# Patient Record
Sex: MALE | Race: WHITE | NOT HISPANIC OR LATINO | URBAN - METROPOLITAN AREA
[De-identification: names, ages, dates, MRNs, and addresses within clinical notes are randomized per-mention and may not be internally consistent; named-entity substitution may affect disease eponyms.]

---

## 2020-03-11 PROBLEM — 399068003 MALIGNANT TUMOR OF PROSTATE: Status: ACTIVE | Noted: 2020-03-11

## 2020-03-11 PROBLEM — 90560007 GOUT: Status: ACTIVE | Noted: 2020-03-11

## 2020-03-11 PROBLEM — 38341003 HYPERTENSION: Status: ACTIVE | Noted: 2020-03-11

## 2021-07-27 ENCOUNTER — OFFICE VISIT (OUTPATIENT)
Dept: URBAN - METROPOLITAN AREA CLINIC 13 | Facility: CLINIC | Age: 71
End: 2021-07-27

## 2021-07-27 PROBLEM — 134407002 CHRONIC BACK PAIN: Status: ACTIVE | Noted: 2021-07-27

## 2021-07-27 PROBLEM — 13644009 HYPERCHOLESTEROLEMIA: Status: ACTIVE | Noted: 2021-07-27

## 2021-07-27 PROBLEM — 95570007 KIDNEY STONE: Status: ACTIVE | Noted: 2021-07-27

## 2021-08-05 ENCOUNTER — OFFICE VISIT (OUTPATIENT)
Dept: URBAN - METROPOLITAN AREA CLINIC 46 | Facility: CLINIC | Age: 71
End: 2021-08-05

## 2021-08-28 ENCOUNTER — TELEPHONE ENCOUNTER (OUTPATIENT)
Dept: URBAN - METROPOLITAN AREA CLINIC 13 | Facility: CLINIC | Age: 71
End: 2021-08-28

## 2021-08-28 RX ORDER — TAMSULOSIN HYDROCHLORIDE 0.4 MG/1
CAPSULE ORAL
OUTPATIENT
End: 2021-07-27

## 2021-08-28 RX ORDER — MELOXICAM 15 MG/1
TABLET ORAL
OUTPATIENT
End: 2021-07-27

## 2021-08-28 RX ORDER — POTASSIUM CHLORIDE 750 MG/1
CAPSULE, EXTENDED RELEASE ORAL
OUTPATIENT
End: 2021-07-27

## 2021-08-29 ENCOUNTER — TELEPHONE ENCOUNTER (OUTPATIENT)
Dept: URBAN - METROPOLITAN AREA CLINIC 13 | Facility: CLINIC | Age: 71
End: 2021-08-29

## 2021-08-29 RX ORDER — FINASTERIDE 5 MG/1
TABLET, FILM COATED ORAL
Status: ACTIVE | COMMUNITY

## 2021-08-29 RX ORDER — FLUTICASONE PROPIONATE 50 UG/1
SPRAY, METERED NASAL
Status: ACTIVE | COMMUNITY

## 2021-08-29 RX ORDER — LOSARTAN POTASSIUM 100 MG/1
TABLET, FILM COATED ORAL
Status: ACTIVE | COMMUNITY

## 2021-08-29 RX ORDER — MECLIZINE HYDROCHLORIDE 25 MG/1
TABLET ORAL
Status: ACTIVE | COMMUNITY

## 2021-08-29 RX ORDER — ALLOPURINOL 300 MG/1
TABLET ORAL
Status: ACTIVE | COMMUNITY

## 2021-08-29 RX ORDER — FUROSEMIDE 20 MG/1
TABLET ORAL
Status: ACTIVE | COMMUNITY

## 2021-08-29 RX ORDER — ASPIRIN 81 MG/1
TABLET, COATED ORAL
Status: ACTIVE | COMMUNITY

## 2021-08-29 RX ORDER — AMLODIPINE BESYLATE 10 MG/1
TABLET ORAL
Status: ACTIVE | COMMUNITY

## 2021-08-29 RX ORDER — TRAMADOL HYDROCHLORIDE 50 MG/1
TABLET, FILM COATED ORAL
Status: ACTIVE | COMMUNITY

## 2021-10-28 ENCOUNTER — OFFICE VISIT (OUTPATIENT)
Dept: URBAN - METROPOLITAN AREA CLINIC 46 | Facility: CLINIC | Age: 71
End: 2021-10-28
Payer: MEDICARE

## 2021-10-28 VITALS
HEIGHT: 67 IN | HEART RATE: 90 BPM | DIASTOLIC BLOOD PRESSURE: 53 MMHG | SYSTOLIC BLOOD PRESSURE: 158 MMHG | TEMPERATURE: 99.1 F | BODY MASS INDEX: 27.37 KG/M2 | WEIGHT: 174.4 LBS

## 2021-10-28 DIAGNOSIS — K70.30 ALCOHOLIC CIRRHOSIS OF LIVER WITHOUT ASCITES: ICD-10-CM

## 2021-10-28 DIAGNOSIS — D50.0 IRON DEFICIENCY ANEMIA DUE TO CHRONIC BLOOD LOSS: ICD-10-CM

## 2021-10-28 PROCEDURE — 99204 OFFICE O/P NEW MOD 45 MIN: CPT | Performed by: INTERNAL MEDICINE

## 2021-10-28 RX ORDER — FUROSEMIDE 20 MG/1
TABLET ORAL
Status: DISCONTINUED | COMMUNITY

## 2021-10-28 RX ORDER — FINASTERIDE 5 MG/1
TABLET, FILM COATED ORAL
Status: DISCONTINUED | COMMUNITY

## 2021-10-28 RX ORDER — ALLOPURINOL 300 MG/1
1 TABLET TABLET ORAL ONCE A DAY
Status: ACTIVE | COMMUNITY

## 2021-10-28 RX ORDER — FUROSEMIDE 20 MG/1
1 TABLET TABLET ORAL ONCE A DAY
Status: ACTIVE | COMMUNITY

## 2021-10-28 RX ORDER — ASPIRIN 81 MG/1
TABLET, COATED ORAL
Status: DISCONTINUED | COMMUNITY

## 2021-10-28 RX ORDER — FLUTICASONE PROPIONATE 50 UG/1
SPRAY, METERED NASAL
Status: DISCONTINUED | COMMUNITY

## 2021-10-28 RX ORDER — TRAMADOL HYDROCHLORIDE 50 MG/1
TABLET, FILM COATED ORAL
Status: DISCONTINUED | COMMUNITY

## 2021-10-28 RX ORDER — GABAPENTIN 300 MG/1
1 CAPSULE CAPSULE ORAL ONCE A DAY
Status: ACTIVE | COMMUNITY

## 2021-10-28 RX ORDER — TAMSULOSIN HYDROCHLORIDE 0.4 MG/1
1 CAPSULE CAPSULE ORAL ONCE A DAY
Status: DISCONTINUED | COMMUNITY

## 2021-10-28 RX ORDER — ALLOPURINOL 300 MG/1
TABLET ORAL
Status: DISCONTINUED | COMMUNITY

## 2021-10-28 RX ORDER — ALBUTEROL SULFATE 90 UG/1
1 PUFF AS NEEDED AEROSOL, METERED RESPIRATORY (INHALATION)
Status: ACTIVE | COMMUNITY

## 2021-10-28 RX ORDER — TURMERIC/TURMERIC ROOT EXTRACT 450MG-50MG
AS DIRECTED CAPSULE ORAL
Status: ACTIVE | COMMUNITY

## 2021-10-28 RX ORDER — ECHINACEA 400 MG
AS DIRECTED CAPSULE ORAL
Status: ACTIVE | COMMUNITY

## 2021-10-28 RX ORDER — AMLODIPINE BESYLATE 10 MG/1
TABLET ORAL
Status: DISCONTINUED | COMMUNITY

## 2021-10-28 RX ORDER — MECLIZINE HYDROCHLORIDE 25 MG/1
TABLET ORAL
Status: DISCONTINUED | COMMUNITY

## 2021-10-28 RX ORDER — LOSARTAN POTASSIUM 100 MG/1
TABLET, FILM COATED ORAL
Status: DISCONTINUED | COMMUNITY

## 2021-11-01 ENCOUNTER — CLAIMS CREATED FROM THE CLAIM WINDOW (OUTPATIENT)
Dept: URBAN - METROPOLITAN AREA CLINIC 4 | Facility: CLINIC | Age: 71
End: 2021-11-01
Payer: MEDICARE

## 2021-11-01 ENCOUNTER — OFFICE VISIT (OUTPATIENT)
Dept: URBAN - METROPOLITAN AREA SURGERY CENTER 28 | Facility: SURGERY CENTER | Age: 71
End: 2021-11-01
Payer: MEDICARE

## 2021-11-01 DIAGNOSIS — D12.3 BENIGN NEOPLASM OF TRANSVERSE COLON: ICD-10-CM

## 2021-11-01 DIAGNOSIS — K31.89 ACQUIRED DEFORMITY OF DUODENUM: ICD-10-CM

## 2021-11-01 DIAGNOSIS — D12.3 ADENOMA OF TRANSVERSE COLON: ICD-10-CM

## 2021-11-01 DIAGNOSIS — D12.5 ADENOMA OF SIGMOID COLON: ICD-10-CM

## 2021-11-01 DIAGNOSIS — K92.2 ACUTE GASTROINTESTINAL BLEEDING: ICD-10-CM

## 2021-11-01 DIAGNOSIS — D12.2 ADENOMA OF ASCENDING COLON: ICD-10-CM

## 2021-11-01 DIAGNOSIS — D12.5 BENIGN NEOPLASM OF SIGMOID COLON: ICD-10-CM

## 2021-11-01 DIAGNOSIS — D12.2 BENIGN NEOPLASM OF ASCENDING COLON: ICD-10-CM

## 2021-11-01 DIAGNOSIS — K92.1 ACUTE MELENA: ICD-10-CM

## 2021-11-01 DIAGNOSIS — K31.89 DEFORMED PYLORUS, ACQUIRED: ICD-10-CM

## 2021-11-01 PROCEDURE — G8907 PT DOC NO EVENTS ON DISCHARG: HCPCS | Performed by: INTERNAL MEDICINE

## 2021-11-01 PROCEDURE — 88305 TISSUE EXAM BY PATHOLOGIST: CPT | Performed by: PATHOLOGY

## 2021-11-01 PROCEDURE — 43239 EGD BIOPSY SINGLE/MULTIPLE: CPT | Performed by: INTERNAL MEDICINE

## 2021-11-01 PROCEDURE — 45385 COLONOSCOPY W/LESION REMOVAL: CPT | Performed by: INTERNAL MEDICINE

## 2021-11-01 PROCEDURE — 88312 SPECIAL STAINS GROUP 1: CPT | Performed by: PATHOLOGY

## 2021-11-01 RX ORDER — ALLOPURINOL 300 MG/1
1 TABLET TABLET ORAL ONCE A DAY
Status: ACTIVE | COMMUNITY

## 2021-11-01 RX ORDER — OMEPRAZOLE 40 MG/1
1 CAPSULE 30 MINUTES BEFORE MORNING MEAL CAPSULE, DELAYED RELEASE ORAL ONCE A DAY
Qty: 60 | Refills: 0 | OUTPATIENT
Start: 2021-11-01

## 2021-11-01 RX ORDER — FUROSEMIDE 20 MG/1
1 TABLET TABLET ORAL ONCE A DAY
Status: ACTIVE | COMMUNITY

## 2021-11-01 RX ORDER — GABAPENTIN 300 MG/1
1 CAPSULE CAPSULE ORAL ONCE A DAY
Status: ACTIVE | COMMUNITY

## 2021-11-01 RX ORDER — ALBUTEROL SULFATE 90 UG/1
1 PUFF AS NEEDED AEROSOL, METERED RESPIRATORY (INHALATION)
Status: ACTIVE | COMMUNITY

## 2021-11-01 RX ORDER — ECHINACEA 400 MG
AS DIRECTED CAPSULE ORAL
Status: ACTIVE | COMMUNITY

## 2021-11-01 RX ORDER — TURMERIC/TURMERIC ROOT EXTRACT 450MG-50MG
AS DIRECTED CAPSULE ORAL
Status: ACTIVE | COMMUNITY

## 2022-01-01 ENCOUNTER — OFFICE VISIT (OUTPATIENT)
Dept: URBAN - METROPOLITAN AREA CLINIC 46 | Facility: CLINIC | Age: 72
End: 2022-01-01

## 2022-01-01 ENCOUNTER — OFFICE VISIT (OUTPATIENT)
Dept: URBAN - METROPOLITAN AREA CLINIC 46 | Facility: CLINIC | Age: 72
End: 2022-01-01
Payer: MEDICARE

## 2022-01-01 ENCOUNTER — TELEPHONE ENCOUNTER (OUTPATIENT)
Dept: URBAN - METROPOLITAN AREA CLINIC 46 | Facility: CLINIC | Age: 72
End: 2022-01-01

## 2022-01-01 VITALS
HEIGHT: 67 IN | WEIGHT: 197.6 LBS | SYSTOLIC BLOOD PRESSURE: 124 MMHG | BODY MASS INDEX: 31.01 KG/M2 | HEART RATE: 93 BPM | TEMPERATURE: 98.3 F | DIASTOLIC BLOOD PRESSURE: 67 MMHG

## 2022-01-01 DIAGNOSIS — K70.30 CIRRHOSIS, ALCOHOLIC: ICD-10-CM

## 2022-01-01 DIAGNOSIS — K64.9 HEMORRHOIDS WITHOUT COMPLICATION: ICD-10-CM

## 2022-01-01 DIAGNOSIS — K64.2 GRADE III HEMORRHOIDS: ICD-10-CM

## 2022-01-01 DIAGNOSIS — K21.9 CHRONIC GERD: ICD-10-CM

## 2022-01-01 DIAGNOSIS — D50.0 IRON DEFICIENCY ANEMIA DUE TO CHRONIC BLOOD LOSS: ICD-10-CM

## 2022-01-01 PROCEDURE — 99213 OFFICE O/P EST LOW 20 MIN: CPT | Performed by: INTERNAL MEDICINE

## 2022-01-01 PROCEDURE — 46221 LIGATION OF HEMORRHOID(S): CPT | Performed by: INTERNAL MEDICINE

## 2022-01-01 RX ORDER — OMEPRAZOLE 40 MG/1
TAKE 1 CAPSULE BY MOUTH 30 MINUTES BEFORE A MORNING MEAL ONCE EACH MORNING CAPSULE, DELAYED RELEASE ORAL
Status: ACTIVE | COMMUNITY

## 2022-01-01 RX ORDER — FUROSEMIDE 20 MG/1
1 TABLET TABLET ORAL ONCE A DAY
Status: ACTIVE | COMMUNITY

## 2022-01-01 RX ORDER — ALLOPURINOL 300 MG/1
1 TABLET TABLET ORAL ONCE A DAY
Status: ACTIVE | COMMUNITY

## 2022-01-01 RX ORDER — ALBUTEROL SULFATE 90 UG/1
1 PUFF AS NEEDED AEROSOL, METERED RESPIRATORY (INHALATION)
Status: ACTIVE | COMMUNITY

## 2022-01-01 RX ORDER — TURMERIC/TURMERIC ROOT EXTRACT 450MG-50MG
AS DIRECTED CAPSULE ORAL
Status: ACTIVE | COMMUNITY

## 2022-01-01 RX ORDER — OMEPRAZOLE 40 MG/1
TAKE 1 CAPSULE BY MOUTH 30 MINUTES BEFORE A MORNING MEAL ONCE EACH MORNING CAPSULE, DELAYED RELEASE ORAL
OUTPATIENT

## 2022-01-01 RX ORDER — GABAPENTIN 300 MG/1
1 CAPSULE CAPSULE ORAL ONCE A DAY
Status: ACTIVE | COMMUNITY

## 2022-01-01 RX ORDER — METHOCARBAMOL TABLETS 750 MG/1
TABLET, COATED ORAL
Qty: 60 TABLET | Status: ACTIVE | COMMUNITY

## 2022-01-01 RX ORDER — ECHINACEA 400 MG
AS DIRECTED CAPSULE ORAL
Status: ACTIVE | COMMUNITY

## 2022-01-01 RX ORDER — LOSARTAN POTASSIUM 100 MG/1
TABLET, FILM COATED ORAL
Qty: 90 TABLET | Status: ACTIVE | COMMUNITY

## 2022-01-18 ENCOUNTER — OFFICE VISIT (OUTPATIENT)
Dept: URBAN - METROPOLITAN AREA CLINIC 46 | Facility: CLINIC | Age: 72
End: 2022-01-18

## 2022-01-20 ENCOUNTER — ERX REFILL RESPONSE (OUTPATIENT)
Dept: URBAN - METROPOLITAN AREA CLINIC 44 | Facility: CLINIC | Age: 72
End: 2022-01-20

## 2022-01-20 RX ORDER — OMEPRAZOLE 40 MG/1
TAKE 1 CAPSULE BY MOUTH 30 MINUTES BEFORE A MORNING MEAL ONCE EACH MORNING CAPSULE, DELAYED RELEASE ORAL
Qty: 60 CAPSULE | Refills: 4 | OUTPATIENT

## 2022-01-20 RX ORDER — OMEPRAZOLE 40 MG/1
1 CAPSULE 30 MINUTES BEFORE MORNING MEAL CAPSULE, DELAYED RELEASE ORAL ONCE A DAY
Qty: 60 | Refills: 0 | OUTPATIENT

## 2022-02-15 ENCOUNTER — OFFICE VISIT (OUTPATIENT)
Dept: URBAN - METROPOLITAN AREA CLINIC 46 | Facility: CLINIC | Age: 72
End: 2022-02-15
Payer: MEDICARE

## 2022-02-15 VITALS
DIASTOLIC BLOOD PRESSURE: 75 MMHG | HEIGHT: 67 IN | SYSTOLIC BLOOD PRESSURE: 129 MMHG | WEIGHT: 187.2 LBS | TEMPERATURE: 98.4 F | HEART RATE: 105 BPM | BODY MASS INDEX: 29.38 KG/M2

## 2022-02-15 DIAGNOSIS — D50.0 IRON DEFICIENCY ANEMIA DUE TO CHRONIC BLOOD LOSS: ICD-10-CM

## 2022-02-15 DIAGNOSIS — K21.9 CHRONIC GERD: ICD-10-CM

## 2022-02-15 DIAGNOSIS — R79.89 LFT ELEVATION: ICD-10-CM

## 2022-02-15 DIAGNOSIS — K70.30 CIRRHOSIS, ALCOHOLIC: ICD-10-CM

## 2022-02-15 PROCEDURE — 99214 OFFICE O/P EST MOD 30 MIN: CPT | Performed by: INTERNAL MEDICINE

## 2022-02-15 RX ORDER — ECHINACEA 400 MG
AS DIRECTED CAPSULE ORAL
Status: ACTIVE | COMMUNITY

## 2022-02-15 RX ORDER — TURMERIC/TURMERIC ROOT EXTRACT 450MG-50MG
AS DIRECTED CAPSULE ORAL
Status: ACTIVE | COMMUNITY

## 2022-02-15 RX ORDER — ALLOPURINOL 300 MG/1
1 TABLET TABLET ORAL ONCE A DAY
Status: ACTIVE | COMMUNITY

## 2022-02-15 RX ORDER — ALBUTEROL SULFATE 90 UG/1
1 PUFF AS NEEDED AEROSOL, METERED RESPIRATORY (INHALATION)
Status: ACTIVE | COMMUNITY

## 2022-02-15 RX ORDER — OMEPRAZOLE 40 MG/1
TAKE 1 CAPSULE BY MOUTH 30 MINUTES BEFORE A MORNING MEAL ONCE EACH MORNING CAPSULE, DELAYED RELEASE ORAL
OUTPATIENT

## 2022-02-15 RX ORDER — OMEPRAZOLE 40 MG/1
TAKE 1 CAPSULE BY MOUTH 30 MINUTES BEFORE A MORNING MEAL ONCE EACH MORNING CAPSULE, DELAYED RELEASE ORAL
Qty: 60 CAPSULE | Refills: 4 | Status: ACTIVE | COMMUNITY

## 2022-02-15 RX ORDER — FUROSEMIDE 20 MG/1
1 TABLET TABLET ORAL ONCE A DAY
Status: ACTIVE | COMMUNITY

## 2022-02-15 RX ORDER — GABAPENTIN 300 MG/1
1 CAPSULE CAPSULE ORAL ONCE A DAY
Status: ACTIVE | COMMUNITY

## 2022-02-15 NOTE — HPI-TODAY'S VISIT:
Pt presented 8/5/2021 with six months of post prandial nausea, anorexia, bowel urgency and loose stools, 30lb weight loss. Admitted to 30 years of heavy ETOH use. Labs with AFP 4; Albumin 3.3; ALk 268; ; ALT 52; Bili 4.2  Hgb 11; ; . Viral hep panel negative. Pt felt to likely have decompensated ETOH cirrhosis and possible underlying malignancy. CT scan of the abd/pelvis ordered and showed cirrhotic liver with no lesions and only trace ascites.   Seen for a follow up 10/2021 and had a drop in Hgb to  8.9 with subjective melena; was taking ASA daily and stopped. A diagnostic EGD and colonoscopy was then peformed with no portal HTN but there was an erosive gastropathy for which Omeprazole 40mg daily prescribed; no bleeding source in the colon but multiple colon adenomas for which a three year surveillance colonoscopy advised. Pt was already on oral iron. Now presents for a follow up and states was referred to Dr Messina with Hematology and had labs last week. Finished 4 months of oral iron and is off now. No overt bleeding fatigue stable. States took Omeprazole for 60 days and stopped and has been off ASA and no NSAIDS. States started to have some indigestion after stopping Omeprazole and we called in another Rx and taking prn with relief. Weight has been stable and gaining again. No ETOH in 6 montbs.

## 2022-08-04 PROBLEM — 721705006: Status: ACTIVE | Noted: 2022-01-01

## 2022-08-04 NOTE — HPI-TODAY'S VISIT:
Pt presented 8/5/2021 with six months of post prandial nausea, anorexia, bowel urgency and loose stools, 30lb weight loss. Admitted to 30 years of heavy ETOH use. Labs with AFP 4; Albumin 3.3; ALk 268; ; ALT 52; Bili 4.2  Hgb 11; ; . Viral hep panel negative. Pt felt to likely have decompensated ETOH cirrhosis and possible underlying malignancy. CT scan of the abd/pelvis ordered and showed cirrhotic liver with no lesions and only trace ascites.   Seen for a follow up 10/2021 and had a drop in Hgb to  8.9 with subjective melena; was taking ASA daily and stopped. A diagnostic EGD and colonoscopy was then peformed with no portal HTN but there was an erosive gastropathy for which Omeprazole 40mg daily prescribed; no bleeding source in the colon but multiple colon adenomas for which a three year surveillance colonoscopy advised. Pt was already on oral iron.   Last seen 2/2022: was referred to Dr Messina with Hematology and had labs last pending; just finished 4 months of oral iron and was off. Was off PPI. Denied ASA, NSAID, ETOH. Pt states did have to later get IV iron and per last labs in 5/2022 Hgb back to normal. Has a follow up this month. States no signs of hepatic decompensation; no overt GI bleeding; no abdominal swelling. Loose stools resolved and no nausea. Weight stable. Is stating has a hemorrhoid and has trouble cleansing after BM's.

## 2022-08-04 NOTE — PHYSICAL EXAM GASTROINTESTINAL
Abdomen , soft, nontender, nondistended , no guarding or rigidity , no masses palpable , normal bowel sounds , Liver and Spleen,  no hepatosplenomegaly , liver nontender. Diastsis recti Rectal: RP tag and fecal smearing

## 2022-08-10 NOTE — PHYSICAL EXAM HENT:
Head, normocephalic, atraumatic, Face, Face within normal limits, Ears, External ears within normal limits
Statement Selected

## 2023-01-01 ENCOUNTER — OFFICE VISIT (OUTPATIENT)
Dept: URBAN - METROPOLITAN AREA CLINIC 46 | Facility: CLINIC | Age: 73
End: 2023-01-01
Payer: MEDICARE

## 2023-01-01 ENCOUNTER — OFFICE VISIT (OUTPATIENT)
Dept: URBAN - METROPOLITAN AREA CLINIC 46 | Facility: CLINIC | Age: 73
End: 2023-01-01

## 2023-01-01 ENCOUNTER — ERX REFILL RESPONSE (OUTPATIENT)
Dept: URBAN - METROPOLITAN AREA CLINIC 44 | Facility: CLINIC | Age: 73
End: 2023-01-01

## 2023-01-01 ENCOUNTER — DASHBOARD ENCOUNTERS (OUTPATIENT)
Age: 73
End: 2023-01-01

## 2023-01-01 VITALS
BODY MASS INDEX: 32.77 KG/M2 | OXYGEN SATURATION: 97 % | TEMPERATURE: 98.4 F | WEIGHT: 208.8 LBS | HEART RATE: 89 BPM | HEIGHT: 67 IN | SYSTOLIC BLOOD PRESSURE: 125 MMHG | DIASTOLIC BLOOD PRESSURE: 64 MMHG

## 2023-01-01 DIAGNOSIS — K64.1 GRADE II HEMORRHOIDS: ICD-10-CM

## 2023-01-01 DIAGNOSIS — K70.30 CIRRHOSIS, ALCOHOLIC: ICD-10-CM

## 2023-01-01 DIAGNOSIS — K21.9 CHRONIC GERD: ICD-10-CM

## 2023-01-01 DIAGNOSIS — R79.89 LFT ELEVATION: ICD-10-CM

## 2023-01-01 PROCEDURE — 99214 OFFICE O/P EST MOD 30 MIN: CPT | Performed by: INTERNAL MEDICINE

## 2023-01-01 PROCEDURE — 46221 LIGATION OF HEMORRHOID(S): CPT | Performed by: INTERNAL MEDICINE

## 2023-01-01 RX ORDER — LOSARTAN POTASSIUM 100 MG/1
1 TABLET TABLET, FILM COATED ORAL ONCE A DAY
Qty: 90 TABLET | Status: ACTIVE | COMMUNITY

## 2023-01-01 RX ORDER — OMEPRAZOLE 40 MG/1
TAKE 1 CAPSULE BY MOUTH 30 MINUTES BEFORE A MORNING MEAL ONCE EACH MORNING CAPSULE, DELAYED RELEASE ORAL
Qty: 60 CAPSULE | Refills: 5 | OUTPATIENT

## 2023-01-01 RX ORDER — ECHINACEA 400 MG
AS DIRECTED CAPSULE ORAL ONCE A DAY
Status: ACTIVE | COMMUNITY

## 2023-01-01 RX ORDER — OMEPRAZOLE 40 MG/1
TAKE 1 CAPSULE BY MOUTH 30 MINUTES BEFORE A MORNING MEAL ONCE EACH MORNING CAPSULE, DELAYED RELEASE ORAL
Qty: 60 CAPSULE | Refills: 5 | Status: ACTIVE | COMMUNITY

## 2023-01-01 RX ORDER — OMEPRAZOLE 40 MG/1
TAKE 1 CAPSULE BY MOUTH 30 MINUTES BEFORE A MORNING MEAL ONCE EACH MORNING CAPSULE, DELAYED RELEASE ORAL
OUTPATIENT

## 2023-01-01 RX ORDER — ALLOPURINOL 300 MG/1
1 TABLET TABLET ORAL ONCE A DAY
Status: ACTIVE | COMMUNITY

## 2023-01-01 RX ORDER — GABAPENTIN 300 MG/1
3 CAPSULE CAPSULE ORAL ONCE A DAY
Status: ACTIVE | COMMUNITY

## 2023-01-01 RX ORDER — METFORMIN HYDROCHLORIDE 500 MG/1
1 TABLET WITH A MEAL TABLET ORAL TWICE A DAY
Qty: 60 TABLET | Status: ACTIVE | COMMUNITY

## 2023-01-01 RX ORDER — FUROSEMIDE 20 MG/1
1 TABLET TABLET ORAL ONCE A DAY
Status: ACTIVE | COMMUNITY

## 2023-01-01 RX ORDER — ALBUTEROL SULFATE 90 UG/1
1 PUFF AS NEEDED AEROSOL, METERED RESPIRATORY (INHALATION)
Status: ACTIVE | COMMUNITY

## 2023-01-01 RX ORDER — METHOCARBAMOL TABLETS 750 MG/1
1 TABLET TABLET, COATED ORAL
Qty: 90 TABLET | Status: ACTIVE | COMMUNITY

## 2023-01-01 RX ORDER — TURMERIC/TURMERIC ROOT EXTRACT 450MG-50MG
AS DIRECTED CAPSULE ORAL ONCE A DAY
Status: ACTIVE | COMMUNITY

## 2023-02-03 PROBLEM — 724556004: Status: ACTIVE | Noted: 2021-10-28

## 2023-02-03 PROBLEM — 428283002: Status: ACTIVE | Noted: 2023-01-01

## 2023-02-03 PROBLEM — 70153002: Status: ACTIVE | Noted: 2023-01-01

## 2023-02-03 PROBLEM — 67251005 INCREASED LIVER FUNCTION: Status: ACTIVE | Noted: 2022-02-15

## 2023-02-03 NOTE — HPI-TODAY'S VISIT:
Pt presented 8/5/2021 with six months of post prandial nausea, anorexia, bowel urgency and loose stools, 30lb weight loss. Admitted to 30 years of heavy ETOH use. Labs with AFP 4; Albumin 3.3; ALk 268; ; ALT 52; Bili 4.2  Hgb 11; ; . Viral hep panel negative. Pt felt to likely have decompensated ETOH cirrhosis and possible underlying malignancy. CT scan of the abd/pelvis ordered and showed cirrhotic liver with no lesions and only trace ascites.   Seen for a follow up 10/2021 and had a drop in Hgb to  8.9 with subjective melena; was taking ASA daily and stopped. A diagnostic EGD and colonoscopy was then peformed with no portal HTN but there was an erosive gastropathy for which Omeprazole 40mg daily prescribed; no bleeding source in the colon but multiple colon adenomas for which a three year surveillance colonoscopy advised. Pt was already on oral iron.   8/2022: just finished 4 months of oral iron and was off. Was off PPI. Denied ASA, NSAID, ETOH. Pt states did have get IV iron and per last labs in 5/2022 Hgb back to normal.  States no signs of hepatic decompensation; no overt GI bleeding; no abdominal swelling. Loose stools resolved and no nausea. Weight stable. Is stating has a hemorrhoid and has trouble cleansing after BM's. and a CRH banding performed in office.   2/3/2023: For a follow up. CRH banding helped with leakage but not resovled. No overt GI bleeding. No hepatic decompensation. Still one drink per day. GERD controlled unless eats inciting foods. Labs 2 weeks ago and Ferritin 327; TIBC 259; iron 155; iron sat 60%; WBC 5; Hgb 14; ; PLT 109K; Bili 1.3; Alk 167; Albumin 4.2; AST/ALT wnl

## 2023-02-07 PROBLEM — 235595009: Status: ACTIVE | Noted: 2022-02-15

## 2023-02-07 PROBLEM — 420054005 ALCOHOLIC CIRRHOSIS: Status: ACTIVE | Noted: 2022-02-15

## 2023-08-04 ENCOUNTER — OFFICE VISIT (OUTPATIENT)
Dept: URBAN - METROPOLITAN AREA CLINIC 46 | Facility: CLINIC | Age: 73
End: 2023-08-04